# Patient Record
Sex: FEMALE | Race: BLACK OR AFRICAN AMERICAN | NOT HISPANIC OR LATINO
[De-identification: names, ages, dates, MRNs, and addresses within clinical notes are randomized per-mention and may not be internally consistent; named-entity substitution may affect disease eponyms.]

---

## 2024-01-19 ENCOUNTER — APPOINTMENT (OUTPATIENT)
Dept: OBGYN | Facility: CLINIC | Age: 19
End: 2024-01-19

## 2024-06-07 ENCOUNTER — NON-APPOINTMENT (OUTPATIENT)
Age: 19
End: 2024-06-07

## 2024-06-07 ENCOUNTER — APPOINTMENT (OUTPATIENT)
Dept: OBGYN | Facility: CLINIC | Age: 19
End: 2024-06-07
Payer: COMMERCIAL

## 2024-06-07 VITALS
BODY MASS INDEX: 30.36 KG/M2 | SYSTOLIC BLOOD PRESSURE: 124 MMHG | HEART RATE: 87 BPM | WEIGHT: 180 LBS | HEIGHT: 64.5 IN | DIASTOLIC BLOOD PRESSURE: 78 MMHG | RESPIRATION RATE: 18 BRPM

## 2024-06-07 DIAGNOSIS — Z01.419 ENCOUNTER FOR GYNECOLOGICAL EXAMINATION (GENERAL) (ROUTINE) W/OUT ABNORMAL FINDINGS: ICD-10-CM

## 2024-06-07 DIAGNOSIS — Z78.9 OTHER SPECIFIED HEALTH STATUS: ICD-10-CM

## 2024-06-07 PROCEDURE — 99385 PREV VISIT NEW AGE 18-39: CPT

## 2024-06-07 NOTE — HISTORY OF PRESENT ILLNESS
[FreeTextEntry1] : 17 yo G0 for [employer]. Denies any complaints. She desires STD testing but reports she has never had vaginal intercourse. Reports had HPV vaccine in the past. Periods are regular without complaints. She has never had a pelvic exam prior.

## 2024-06-07 NOTE — PHYSICAL EXAM
[Chaperone Present] : A chaperone was present in the examining room during all aspects of the physical examination [[id number]] : A chaperone was present during the pelvic exam. [FreeTextEntry2] : Katherine [Appropriately responsive] : appropriately responsive [Normal] : normal [Examination Of The Breasts] : a normal appearance [No Masses] : no breast masses were palpable [FreeTextEntry1] : deferred

## 2024-06-07 NOTE — DISCUSSION/SUMMARY
[FreeTextEntry1] : 17 yo for [employer] - advised pap smear screening to start at 20 yo per [facility] guidelines, discussed can also defer at that time if she is not sexually active.  -referral given for STI testing - declined options for birth control -f/u 1 year or PRN